# Patient Record
Sex: MALE | Race: BLACK OR AFRICAN AMERICAN | NOT HISPANIC OR LATINO | URBAN - METROPOLITAN AREA
[De-identification: names, ages, dates, MRNs, and addresses within clinical notes are randomized per-mention and may not be internally consistent; named-entity substitution may affect disease eponyms.]

---

## 2024-05-02 ENCOUNTER — TELEPHONE (OUTPATIENT)
Dept: GASTROENTEROLOGY | Facility: CLINIC | Age: 44
End: 2024-05-02
Payer: COMMERCIAL

## 2024-05-02 NOTE — TELEPHONE ENCOUNTER
I called and left a message to call the office to schedule an appointment.  Referral from Dr. LOPEZ Kaiser for Rectal Bleed.  TM

## 2025-04-24 ENCOUNTER — HOSPITAL ENCOUNTER (EMERGENCY)
Facility: HOSPITAL | Age: 45
Discharge: HOME | End: 2025-04-24
Payer: COMMERCIAL

## 2025-04-24 VITALS
OXYGEN SATURATION: 97 % | HEART RATE: 85 BPM | RESPIRATION RATE: 17 BRPM | SYSTOLIC BLOOD PRESSURE: 123 MMHG | WEIGHT: 295 LBS | DIASTOLIC BLOOD PRESSURE: 88 MMHG | TEMPERATURE: 97.5 F | BODY MASS INDEX: 39.96 KG/M2 | HEIGHT: 72 IN

## 2025-04-24 DIAGNOSIS — J02.0 STREP PHARYNGITIS: Primary | ICD-10-CM

## 2025-04-24 LAB
HETEROPH AB SERPLBLD QL IA.RAPID: NEGATIVE
S PYO DNA THROAT QL NAA+PROBE: DETECTED

## 2025-04-24 PROCEDURE — 2500000001 HC RX 250 WO HCPCS SELF ADMINISTERED DRUGS (ALT 637 FOR MEDICARE OP): Performed by: PHYSICIAN ASSISTANT

## 2025-04-24 PROCEDURE — 99283 EMERGENCY DEPT VISIT LOW MDM: CPT

## 2025-04-24 PROCEDURE — 86308 HETEROPHILE ANTIBODY SCREEN: CPT | Performed by: PHYSICIAN ASSISTANT

## 2025-04-24 PROCEDURE — 87651 STREP A DNA AMP PROBE: CPT | Performed by: PHYSICIAN ASSISTANT

## 2025-04-24 PROCEDURE — 2500000004 HC RX 250 GENERAL PHARMACY W/ HCPCS (ALT 636 FOR OP/ED): Performed by: PHYSICIAN ASSISTANT

## 2025-04-24 RX ORDER — NAPROXEN 500 MG/1
500 TABLET ORAL
Qty: 30 TABLET | Refills: 0 | Status: SHIPPED | OUTPATIENT
Start: 2025-04-24 | End: 2025-05-09

## 2025-04-24 RX ORDER — AMOXICILLIN 250 MG/1
500 CAPSULE ORAL ONCE
Status: COMPLETED | OUTPATIENT
Start: 2025-04-24 | End: 2025-04-24

## 2025-04-24 RX ORDER — AMOXICILLIN 500 MG/1
500 CAPSULE ORAL EVERY 12 HOURS SCHEDULED
Qty: 20 CAPSULE | Refills: 0 | Status: SHIPPED | OUTPATIENT
Start: 2025-04-24 | End: 2025-05-04

## 2025-04-24 RX ORDER — ACETAMINOPHEN 325 MG/1
650 TABLET ORAL ONCE
Status: COMPLETED | OUTPATIENT
Start: 2025-04-24 | End: 2025-04-24

## 2025-04-24 RX ORDER — ACETAMINOPHEN 325 MG/1
650 TABLET ORAL EVERY 6 HOURS PRN
Qty: 30 TABLET | Refills: 0 | Status: SHIPPED | OUTPATIENT
Start: 2025-04-24 | End: 2025-05-04

## 2025-04-24 RX ORDER — ONDANSETRON 4 MG/1
4 TABLET, ORALLY DISINTEGRATING ORAL EVERY 8 HOURS PRN
Qty: 20 TABLET | Refills: 0 | Status: SHIPPED | OUTPATIENT
Start: 2025-04-24 | End: 2025-05-01

## 2025-04-24 RX ADMIN — AMOXICILLIN 500 MG: 250 CAPSULE ORAL at 21:13

## 2025-04-24 RX ADMIN — ACETAMINOPHEN 650 MG: 325 TABLET ORAL at 21:12

## 2025-04-24 RX ADMIN — DEXAMETHASONE 10 MG: 6 TABLET ORAL at 21:12

## 2025-04-24 ASSESSMENT — PAIN DESCRIPTION - LOCATION
LOCATION: THROAT
LOCATION: THROAT

## 2025-04-24 ASSESSMENT — PAIN DESCRIPTION - DESCRIPTORS: DESCRIPTORS: STABBING

## 2025-04-24 ASSESSMENT — PAIN SCALES - GENERAL
PAINLEVEL_OUTOF10: 7
PAINLEVEL_OUTOF10: 6

## 2025-04-24 ASSESSMENT — PAIN - FUNCTIONAL ASSESSMENT
PAIN_FUNCTIONAL_ASSESSMENT: 0-10
PAIN_FUNCTIONAL_ASSESSMENT: 0-10

## 2025-04-24 ASSESSMENT — COLUMBIA-SUICIDE SEVERITY RATING SCALE - C-SSRS
2. HAVE YOU ACTUALLY HAD ANY THOUGHTS OF KILLING YOURSELF?: NO
6. HAVE YOU EVER DONE ANYTHING, STARTED TO DO ANYTHING, OR PREPARED TO DO ANYTHING TO END YOUR LIFE?: NO
1. IN THE PAST MONTH, HAVE YOU WISHED YOU WERE DEAD OR WISHED YOU COULD GO TO SLEEP AND NOT WAKE UP?: NO

## 2025-04-24 ASSESSMENT — PAIN DESCRIPTION - ORIENTATION: ORIENTATION: INNER

## 2025-04-24 NOTE — Clinical Note
Demetrice Crowley was seen and treated in our emergency department on 4/24/2025.  He may return to work on 04/26/2025.       If you have any questions or concerns, please don't hesitate to call.      Slade Jo PA-C

## 2025-04-25 NOTE — ED PROVIDER NOTES
HPI   Chief Complaint   Patient presents with   • Swollen Glands     Pt states he has had swollen glands x1 week.        This is a 45-year-old male presents from home with chief complaint of sore throat with swollen glands x 1 week.  Patient states it is very painful to swallow.  He denies any fevers, chills, diaphoresis, neck pain, neck stiffness, cough, shortness of breath, chest pain, heart palpitations.  The patient is taken no medications for his symptoms.  He denies any dental pain.  Patient denies any chronic medical conditions.  He has no known drug allergies.      History provided by:  Patient and medical records          Patient History   Medical History[1]  Surgical History[2]  Family History[3]  Social History[4]    Physical Exam   ED Triage Vitals [04/24/25 1853]   Temperature Heart Rate Respirations BP   36.3 °C (97.3 °F) 90 16 148/89      Pulse Ox Temp Source Heart Rate Source Patient Position   95 % Temporal Monitor Sitting      BP Location FiO2 (%)     Right arm --       Physical Exam  Vitals and nursing note reviewed.   Constitutional:       General: He is awake. He is not in acute distress.     Appearance: Normal appearance. He is well-developed and well-groomed. He is not ill-appearing, toxic-appearing or diaphoretic.   HENT:      Head: Normocephalic and atraumatic.      Jaw: There is normal jaw occlusion. No trismus, tenderness, swelling, pain on movement or malocclusion.      Right Ear: Tympanic membrane, ear canal and external ear normal.      Left Ear: Hearing, tympanic membrane, ear canal and external ear normal.      Nose: Nose normal. No congestion.      Mouth/Throat:      Lips: Pink.      Mouth: Mucous membranes are moist.      Pharynx: Oropharynx is clear. Posterior oropharyngeal erythema present. No pharyngeal swelling, oropharyngeal exudate, uvula swelling or postnasal drip.      Tonsils: No tonsillar exudate or tonsillar abscesses.      Comments: Posterior erythema, no exudate, uvula  midline, no drooling, stridor or trismus, normal dentition, normal phonation  Eyes:      Extraocular Movements: Extraocular movements intact.      Conjunctiva/sclera: Conjunctivae normal.      Pupils: Pupils are equal, round, and reactive to light.   Cardiovascular:      Rate and Rhythm: Normal rate and regular rhythm.      Pulses: Normal pulses.      Heart sounds: Normal heart sounds.   Pulmonary:      Effort: Pulmonary effort is normal.      Breath sounds: Normal breath sounds. No wheezing, rhonchi or rales.   Abdominal:      General: Abdomen is flat. Bowel sounds are normal.      Palpations: Abdomen is soft. There is no mass.      Tenderness: There is no abdominal tenderness. There is no guarding.   Musculoskeletal:         General: No swelling or tenderness. Normal range of motion.      Cervical back: Normal range of motion and neck supple.   Lymphadenopathy:      Cervical: Cervical adenopathy present.   Skin:     General: Skin is warm and dry.      Capillary Refill: Capillary refill takes less than 2 seconds.      Findings: No rash.   Neurological:      General: No focal deficit present.      Mental Status: He is alert and oriented to person, place, and time. Mental status is at baseline.   Psychiatric:         Mood and Affect: Mood normal.         Behavior: Behavior normal. Behavior is cooperative.         Thought Content: Thought content normal.         Judgment: Judgment normal.           ED Course & MDM   Diagnoses as of 04/24/25 2106   Strep pharyngitis                 No data recorded     Shai Coma Scale Score: 15 (04/24/25 1854 : Narcisa Roberto RN)                           Medical Decision Making  Temperature 36 3 heart rate 90 respirations 16 blood pressure 148/89 pulse ox was 95% on room air  Patient is strep positive mono negative.  Patient was given Decadron 10 mg p.o., amoxicillin 500 mg p.o., Tylenol 650 mg p.o.  He was discharged home with prescription for amoxicillin, Tylenol, naproxen and  Cepacol throat lozenges.  Recommend close follow-up with his PCP return with any concerns or worsening of symptoms all questions answered prior to discharge.  Patient is handling secretions well denies any evidence to suggest a peritonsillar abscess there is no drooling stridor or trismus        Procedure  Procedures           [1]  History reviewed. No pertinent past medical history.  [2]  History reviewed. No pertinent surgical history.  [3]  No family history on file.  [4]  Social History  Tobacco Use   • Smoking status: Not on file   • Smokeless tobacco: Not on file   Substance Use Topics   • Alcohol use: Not on file   • Drug use: Not on file      Slade Jo PA-C  04/24/25 [id number]

## 2025-04-28 ENCOUNTER — APPOINTMENT (OUTPATIENT)
Dept: PRIMARY CARE | Facility: CLINIC | Age: 45
End: 2025-04-28
Payer: COMMERCIAL

## 2025-04-28 VITALS
HEART RATE: 70 BPM | TEMPERATURE: 97.7 F | SYSTOLIC BLOOD PRESSURE: 132 MMHG | DIASTOLIC BLOOD PRESSURE: 78 MMHG | WEIGHT: 298 LBS | BODY MASS INDEX: 40.36 KG/M2 | HEIGHT: 72 IN

## 2025-04-28 DIAGNOSIS — R73.03 PRE-DIABETES: Primary | ICD-10-CM

## 2025-04-28 DIAGNOSIS — E55.9 VITAMIN D DEFICIENCY: ICD-10-CM

## 2025-04-28 DIAGNOSIS — Z00.00 ANNUAL PHYSICAL EXAM: ICD-10-CM

## 2025-04-28 DIAGNOSIS — J02.0 STREP PHARYNGITIS: ICD-10-CM

## 2025-04-28 DIAGNOSIS — F12.10 MARIJUANA ABUSE: ICD-10-CM

## 2025-04-28 DIAGNOSIS — Z76.89 ENCOUNTER TO ESTABLISH CARE: ICD-10-CM

## 2025-04-28 PROCEDURE — 3008F BODY MASS INDEX DOCD: CPT

## 2025-04-28 PROCEDURE — 99396 PREV VISIT EST AGE 40-64: CPT

## 2025-04-28 PROCEDURE — 99203 OFFICE O/P NEW LOW 30 MIN: CPT

## 2025-04-28 ASSESSMENT — PATIENT HEALTH QUESTIONNAIRE - PHQ9
2. FEELING DOWN, DEPRESSED OR HOPELESS: NOT AT ALL
SUM OF ALL RESPONSES TO PHQ9 QUESTIONS 1 AND 2: 0
1. LITTLE INTEREST OR PLEASURE IN DOING THINGS: NOT AT ALL

## 2025-04-28 NOTE — PROGRESS NOTES
Subjective   Patient ID: Demetrice Crowley is a 45 y.o. male who presents for Follow-up (Pt follow up on a Positive strep test last  Thursday 4/24/25).    HPI  45-year-old male with past medical history of vitamin D deficiency, prediabetes, obesity presented today to [facility] primary care.  He was evaluated in the emergency department on 4/24 for strep throat and started on amoxicillin, symptoms are improving.  No new symptoms at today's visit.  ROS is negative for cough, chest pain, shortness of breath, nausea, vomiting, lightheadedness.  Social history: He smokes marijuana does not smoke cigarettes, no daily alcohol use  Family history: Possible hypertension otherwise unremarkable.  Review of Systems  As above  Objective   /78 (BP Location: Right arm, Patient Position: Sitting, BP Cuff Size: Large adult)   Pulse 70   Temp 36.5 °C (97.7 °F) (Temporal)   Ht 1.829 m (6')   Wt 135 kg (298 lb)   BMI 40.42 kg/m²     Physical Exam  General: Awake, alert/oriented x4, well developed, no acute distress  Head: Atraumatic/Normocephalic  Eyes: Normal external exam, EOMI, PERRLA  ENT: Pharyngeal erythema, enlarged tonsils.  Cardiovascular: RRR, S1/S2, no murmurs, rubs, or gallops, radial pulses +2, no edema of extremities  Pulmonary: CTAB, no respiratory distress. No wheezes, rales, or ronchi  Abdomen: +BS, soft, non-tender, nondistended, no guarding or rebound, no masses noted  MSK: No joint swelling, normal movements of all extremities. Range of motion- normal.  Extremities: FROM, no edema, wounds, or contusions  Skin- No lesions, contusions, or erythema.  Neuro: Alert/oriented x4, no focal motor or sensory deficits  Psychiatric: Judgment intact. Appropriate mood and behavior     Assessment/Plan   Problem List Items Addressed This Visit    None  Visit Diagnoses         Pre-diabetes    -  Primary    History of elevated A1c.  Will order A1c    Relevant Orders    Hemoglobin A1c      Strep pharyngitis        Symptoms are  improving.  Continue amoxicillin as prescribed      Vitamin D deficiency        History of vitamin D deficiency.  Not on any supplement currently.  Ordered vitamin D level    Relevant Orders    Vitamin D 25-Hydroxy,Total (for eval of Vitamin D levels)      Encounter to establish care        Relevant Orders    CBC and Auto Differential    Comprehensive metabolic panel    Lipid panel    Tsh With Reflex To Free T4 If Abnormal    Vitamin B12      BMI 40.0-44.9, adult (Multi)          Annual physical exam        Ordered blood work      Marijuana abuse                Healthcare maintenance:  - PHQ-2: 0  - Ordered the blood work  - Reinforced lifestyle modification